# Patient Record
Sex: MALE | Race: WHITE | URBAN - METROPOLITAN AREA
[De-identification: names, ages, dates, MRNs, and addresses within clinical notes are randomized per-mention and may not be internally consistent; named-entity substitution may affect disease eponyms.]

---

## 2022-01-11 ENCOUNTER — TELEPHONE (OUTPATIENT)
Dept: ORTHOPEDICS | Facility: CLINIC | Age: 42
End: 2022-01-11
Payer: MEDICAID

## 2022-01-11 ENCOUNTER — HISTORICAL (OUTPATIENT)
Dept: ADMINISTRATIVE | Facility: HOSPITAL | Age: 42
End: 2022-01-11

## 2022-01-11 NOTE — TELEPHONE ENCOUNTER
Spoke with pt. Pt states he has a fractured left wrist from an injury that occurred Saturday. appt scheduled for ortho f/u. All questions answered. Pt verbalized understanding.

## 2022-01-11 NOTE — TELEPHONE ENCOUNTER
----- Message from Jaya Graham sent at 1/11/2022  8:27 AM CST -----  Regarding: broken arm    The Pt states that he broke his arm last Friday and went to the ED @ [facility] on Sat.  The Pt states that he was playing with a coworkers kid and fell down.     # [phone]

## 2022-04-12 ENCOUNTER — HISTORICAL (OUTPATIENT)
Dept: ADMINISTRATIVE | Facility: HOSPITAL | Age: 42
End: 2022-04-12
Payer: MEDICAID

## 2022-04-30 VITALS
BODY MASS INDEX: 33.75 KG/M2 | DIASTOLIC BLOOD PRESSURE: 95 MMHG | SYSTOLIC BLOOD PRESSURE: 152 MMHG | HEIGHT: 73 IN | WEIGHT: 254.63 LBS